# Patient Record
Sex: MALE | Race: WHITE | NOT HISPANIC OR LATINO | Employment: FULL TIME | URBAN - METROPOLITAN AREA
[De-identification: names, ages, dates, MRNs, and addresses within clinical notes are randomized per-mention and may not be internally consistent; named-entity substitution may affect disease eponyms.]

---

## 2024-03-11 ENCOUNTER — TELEPHONE (OUTPATIENT)
Dept: GASTROENTEROLOGY | Facility: CLINIC | Age: 64
End: 2024-03-11
Payer: COMMERCIAL

## 2024-03-11 NOTE — TELEPHONE ENCOUNTER
Patient called to schedule a colonoscopy with Dr Barrow.  Currently no order in Epic, and patient has not been seen by a PCP within [facility] in the last 18 months.     The patient will request an order from his [facility] PCP, endoscopy fax number provided.

## 2024-03-12 ENCOUNTER — MEDICAL CORRESPONDENCE (OUTPATIENT)
Dept: HEALTH INFORMATION MANAGEMENT | Facility: CLINIC | Age: 64
End: 2024-03-12
Payer: COMMERCIAL

## 2024-03-14 ENCOUNTER — TRANSCRIBE ORDERS (OUTPATIENT)
Dept: OTHER | Age: 64
End: 2024-03-14

## 2024-03-14 DIAGNOSIS — Z12.11 SCREEN FOR COLON CANCER: Primary | ICD-10-CM

## 2024-03-25 ENCOUNTER — TELEPHONE (OUTPATIENT)
Dept: GASTROENTEROLOGY | Facility: CLINIC | Age: 64
End: 2024-03-25
Payer: COMMERCIAL

## 2024-03-25 VITALS — BODY MASS INDEX: 41.52 KG/M2 | HEIGHT: 70 IN | WEIGHT: 290 LBS

## 2024-03-25 NOTE — TELEPHONE ENCOUNTER
"Endoscopy Scheduling Screen    Have you had a positive Covid test in the last 14 days?  No    What is your communication preference for Instructions and/or Bowel Prep?   MyChart    What insurance is in the chart?  Other:  medica    Ordering/Referring Provider:     TWILA CLARK      (If ordering provider performs procedure, schedule with ordering provider unless otherwise instructed. )    BMI: Estimated body mass index is 35.87 kg/m  as calculated from the following:    Height as of 5/29/15: 1.778 m (5' 10\").    Weight as of 5/29/15: 113.4 kg (250 lb).     Sedation Ordered  moderate sedation.   If patient BMI > 50 do not schedule in [facility].    If patient BMI > 45 do not schedule at [facility].    Are you taking methadone or Suboxone?  No    Have you had difficulties, pain, or discomfort during past endoscopy procedures?  No    Are you taking any prescription medications for pain 3 or more times per week?   NO, No RN review required.    Do you have a history of malignant hyperthermia?  No    (Females) Are you currently pregnant?   No     Have you been diagnosed or told you have pulmonary hypertension?   No    Do you have an LVAD?  No    Have you been told you have moderate to severe sleep apnea?  Yes (RN Review required for scheduling unless scheduling in Hospital.)  cpap  Have you been told you have COPD, asthma, or any other lung disease?  No    Do you have any heart conditions?  No     Have you ever had or are you waiting for an organ transplant?  No. Continue scheduling, no site restrictions.    Have you had a stroke or transient ischemic attack (TIA aka \"mini stroke\" in the last 6 months?   No    Have you been diagnosed with or been told you have cirrhosis of the liver?   No    Are you currently on dialysis?   No    Do you need assistance transferring?   No    BMI: Estimated body mass index is 35.87 kg/m  as calculated from the following:    Height as of 5/29/15: 1.778 m (5' 10\").    Weight as of 5/29/15: 113.4 kg (250 " lb).     Is patients BMI > 40 and scheduling location [facility]?  No    Do you take an injectable medication for weight loss or diabetes (excluding insulin)?  No    Do you take the medication Naltrexone?  No    Do you take blood thinners?  No       Prep   Are you currently on dialysis or do you have chronic kidney disease?  No    Do you have a diagnosis of diabetes?  No    Do you have a diagnosis of cystic fibrosis (CF)?  No    On a regular basis do you go 3 -5 days between bowel movements?  No    BMI > 40?  No    Preferred Pharmacy:    [facility] on [address] in [facility].       Final Scheduling Details     Procedure scheduled  Colonoscopy    Surgeon:  Danial     Date of procedure:  6/12     Pre-OP / PAC:   No - Not required for this site.    Location  [facility] - Patient preference.    Sedation   MAC/Deep Sedation  location      Patient Reminders:   You will receive a call from a Nurse to review instructions and health history.  This assessment must be completed prior to your procedure.  Failure to complete the Nurse assessment may result in the procedure being cancelled.      On the day of your procedure, please designate an adult(s) who can drive you home stay with you for the next 24 hours. The medicines used in the exam will make you sleepy. You will not be able to drive.      You cannot take public transportation, ride share services, or non-medical taxi service without a responsible caregiver.  Medical transport services are allowed with the requirement that a responsible caregiver will receive you at your destination.  We require that drivers and caregivers are confirmed prior to your procedure.

## 2024-03-25 NOTE — TELEPHONE ENCOUNTER
"Pre Assessment RN Review    Focused Assessments      CINTIA Hx  Estimated body mass index is 41.61 kg/m  as calculated from the following:    Height as of this encounter: 1.778 m (5' 10\").    Weight as of this encounter: 131.5 kg (290 lb).     Patient has reported / documented history CINTIA and reports he does use a device for sleep.     Device: CPAP    Severity Assessment    No sleep study available for review.    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     Scheduling Status & Recommendations    Delay scheduling, awaiting clinical input. Message sent to Narcisa Benedict, pre op surgery coordinator at [facility].   "

## 2024-03-26 ENCOUNTER — TELEPHONE (OUTPATIENT)
Dept: GASTROENTEROLOGY | Facility: CLINIC | Age: 64
End: 2024-03-26
Payer: COMMERCIAL

## 2024-03-26 NOTE — TELEPHONE ENCOUNTER
Per request (Jannie Richey RN  )    Information faxed:    Patient face sheet   11/15/23 Office Visit ([facility])   3/9/23 Colonoscopy Report  ([facility])   2/27/23 Office Visit ([facility])     Facility Information:   [facility]   Facility main number: [phone] (Narcisa Benedict's number: [phone])   ATTN: Narcisa Benedict (Pre-Op Surgery Coordinator)   Fax number: [phone]   E-Mail: [email or web address]    Haley Marks LPN

## 2024-03-28 NOTE — TELEPHONE ENCOUNTER
Approved to proceed as scheduled at [facility] per Narcisa Benedict, pre op surgery coordinator.     Jannie Richey RN  Endoscopy Procedure Pre-Assessment RN  [phone], option 4

## 2024-04-27 ENCOUNTER — HEALTH MAINTENANCE LETTER (OUTPATIENT)
Age: 64
End: 2024-04-27

## 2024-05-29 ENCOUNTER — TELEPHONE (OUTPATIENT)
Dept: GASTROENTEROLOGY | Facility: CLINIC | Age: 64
End: 2024-05-29
Payer: COMMERCIAL

## 2024-05-29 DIAGNOSIS — Z12.11 ENCOUNTER FOR SCREENING COLONOSCOPY: Primary | ICD-10-CM

## 2024-05-29 RX ORDER — BISACODYL 5 MG/1
TABLET, DELAYED RELEASE ORAL
Qty: 4 TABLET | Refills: 0 | Status: SHIPPED | OUTPATIENT
Start: 2024-05-29 | End: 2024-05-30

## 2024-05-29 NOTE — LETTER
May 30, 2024      Earle Blake  [address] [place] [address]    [facility] [address]              Dear Earle,      Colonoscopy      Procedure date: 6/12/24    Anticipated arrival time: 1100 AM   (Please note that arrival times may change)    Facility location: [facility]; [address], [place] [address]. Check in location: 2nd Floor       Important Procedure Reminders:     Prep Instructions:   Instructions on how to prepare for your upcoming procedure are found below. Please read instructions carefully. Deviation from instructions may result in less than desired outcomes and procedure may need to be rescheduled.   If you have additional questions regarding how to prepare for your upcoming procedure, contact our endoscopy pre assessment nurses at [phone] option 4 Monday through Friday 7:00am-5:00pm             Policy:   The medications used during the procedure will make you sleepy, so you won't be able to drive. On the day of your procedure, please have an adult ready to drive you home and stay with you for the next 24 hours.   You can't use public transportation, ride-share services, or non-medical taxi services without a responsible caregiver. Medical transport services are okay, but a caregiver must be there to receive you at your destination.   Make sure your  and caregiver are confirmed before your procedure.    Day of procedure:  Please keep in mind that arrival times may change. Let your  know there might be a one-hour window for changes.  We ask that you please check in at the  with your . Your  should remain on campus.  Expect to be at the procedure center for about 1.5-2.5 hours.    Please do not wear jewelry (i.e. earrings, rings, necklaces, watches, etc). Leave your purse, billfold, credit cards, and other valuables at home.   Bring insurance card and ID.     To cancel or reschedule your procedure:   Within 14 days  of your procedure if you develop any flu-like symptoms (such as fever, cough, shortness of breath), COVID-19 like symptoms or exposure to COVID-19, contact our endoscopy team at [phone] option 4 to determine if procedure can be completed or needs to be delayed.   If you need to cancel or reschedule, our endoscopy scheduling team can be reached at [phone], option 2. Monday through Friday, 7:00am-5:00pm.      Medication Reminders:    Please note the following medication holding recommendations:   N/A    ----------------------------------------------------------------------------------------------------------------------------------------------------------------------------------------------------------------------------------------------------------------------                                   Golytely Extended Colonoscopy Prep  Prep instructions for colonoscopy   Pre-Assessment Phone Number: [facility]; [phone] option 4      Please read these instructions carefully at least 7 days prior to your colonoscopy procedure. Be sure to follow all directions completely. The inside of your colon must be clean to allow for a complete examination for the presence of any growths, polyps, and/or abnormalities, as well as their biopsy or removal. A number of tips are included in order to make this part of the procedure as comfortable as possible.    Immediately:  A nurse will call you to go over instructions and your health history.  It's important to complete the nurse assessment before your procedure. If you don't, your procedure might have to be canceled.  You must arrange for an adult to drive you home after your exam. Your colonoscopy cannot be done unless you have a ride. If you need to use public transportation, someone must ride with you and stay with you for a minimum of 6-24 hours.  Check with your insurance company to be sure they will cover this exam.Arrange for a responsible adult to  drive you home on the day of the exam. This cannot be a taxi or a bus as you will need someone with you after the procedure.    7 days prior:  Talk to your prescribing provider: If you take blood thinners (such as Coumadin, Plavix, Xarelto, Eliquis, Lovenox, or others), these medications may need to be stopped temporarily before your procedure. Your prescribing provider will tell you what to do.   Talk to your prescribing provider: If you take prescription NSAIDS (such as Sulindac, Celebrex, Mobic, Relafen). Your prescribing provider will tell you what to do.   If you have diabetes, you should request an early morning appointment.  Stop taking fiber supplements and multivitamins containing iron, or any other medications containing iron.  Fill your prescription for 2 containers of Golytely and 4 Dulcolax tablets at the pharmacy.  It is very important that you stay well hydrated during the colonoscopy prep. The Golytely bowel prep is designed to clean out your colon, but it will not provide hydration. While you are taking the prescribed prep, you should also drink 64 oz. of Gatorade or similar sports drink product to drink for staying hydrated. (Avoid red and purple colors)  Stop eating corn, popcorn, nuts and foods with seeds.   Begin a restricted or low fiber diet (see list below).    2 days prior:  Drink fluids to be well hydrated, this is important. Drink at least 4 large glasses of water, Gatorade, or other similar sports drinks.  It is a good idea to use Vaseline on the skin around your anus after each bowel movement to prevent irritation. Wet wipes also help to reduce irritation.   You can have a light, low-fiber breakfast. You may also have a light, low-fiber lunch.  No solid foods after 1pm. Begin a clear liquid diet. (see list below).  At 4pm, take 2 Dulcolax (bisacodyl) tablets.  At 5pm, mix and drink half of a jug of Golytely bowel prep. Drink an 8 oz. Glass of Golytely every 10-15 minutes until half of  the jug is gone. Place the remainder of the Golytely in the refrigerator. Stay close to the bathroom.     1 day prior:  Continue clear liquid diet only (see examples below). Do not eat solid food this day.  Do not drink any red or purple colored drinks.  Stop taking NSAID pain relievers, such as Advil, Ibuprofen, Motrin, etc.  You may take Tylenol.  At 4 pm, take 2 Dulcolax (bisacodyl) tablets.  At 5 pm, drink the 2nd half of a jug of Golytely bowel prep. Drink an 8 oz. glass of Golytely every 10-15 minutes until the 1st jug of Golytely is gone.   The Golytely bowel prep will not keep you hydrated. You should drink 8-10 glasses of clear liquids throughout the day.  Before you go to bed, mix the 2nd container of Golytely and place in the refrigerator for the morning.      Procedure day:  6 hours before your check-in time, drink an 8 oz. Glass of Golytely every 10-15 minutes until half of the 2nd jug of Golytely is gone. You WILL NOT drink the entire 2nd jug of Golytely.   You may take your necessary morning medications with sips of water  Do not take diabetes medicine by mouth until after your exam.  You may drink clear liquids only up until 2 hours before your arrival time.  Do not smoke or swallow anything, including water or gum for at least 2 hours before your arrival time. This is a safety issue. Your procedure could be cancelled if you do not follow directions.  No chewing tobacco 6 hours prior to procedure arrival time.   Please do not wear jewelry (i.e. earrings, rings, necklaces, watches, etc) . Leave your purse, billfold, credit cards, and other valuables at home.   Please arrive with a responsible adult who can take you home after the test and stay with you for a minimum of 24 hours: The medicine used will make you sleepy and forgetful. If you do not have someone to take you home, we will cancel your procedure. If using public transportation you must have someone to ride with you.  Please perform your  nebulizer treatments and airway clearance therapy in the morning prior to the procedure (if applicable).  If you have asthma, bring your inhalers.                                          CLEAR LIQUID DIET   You may have:  Water, tea, coffee (no milk or cream)  Soda pop, Gatorade (not red or purple)  Jell-O, Popsicles (no milk or fruit pieces - not red or purple)  Fat-free soup broth or bouillon  Plain hard candy, such as clear life savers (not red or purple)  Clear juices and fruit-flavored drinks, such as apple juice, white grape juice, Hi-C, and Noe-Aid (not red or purple)   Do not have:  Milk or milk products such as ice cream, malts or shakes, or coffee creamer  Red or purple drinks of any kind such as cranberry juice or grape juice. Avoid red or purple Jell-O, Popsicles, Noe-Aid, sorbet, sherbet and candy  Juices with pulp such as orange, grapefruit, pineapple or tomato juice  Cream soups of any kind  Alcohol and beer  Protein drinks or protein powder     LOW FIBER DIET   You may have:    Starches: White bread, rolls, biscuits, croissants, [place] toast, white flour tortillas, waffles, pancakes, Ukrainian toast; white rice, noodles, pasta, macaroni; cooked and peeled potatoes; plain crackers, saltines; cooked farina or cream of rice; puffed rice, corn flakes, Rice Krispies, Special K   Vegetables: tender cooked and canned, vegetable broths  Fruits and fruit juices: Strained fruit juice, canned fruit without seeds or skin (not pineapple), applesauce, pear sauce, ripe bananas, melons (not watermelon)   Milk products: Milk (plain or flavored), cheese, cottage cheese, yogurt (no berries), custard, ice cream    Proteins: Tender, well-cooked ground beef, lamb, veal, ham, pork, chicken, turkey, fish or organ meats; eggs; creamy peanut butter   Fats and condiments:  Margarine, butter, oils, mayonnaise, sour cream, salad dressing, plain gravy; spices, cooked herbs; sugar, clear jelly, honey, syrup   Snacks, sweets and  drinks: Pretzels, hard candy; plain cakes and cookies (no nuts or seeds); gelatin, plain pudding, sherbet, Popsicles; coffee, tea, carbonated ( fizzy ) drinks Do not have:    Starches: Breads or rolls that contain nuts, seeds or fruit; whole wheat or whole grain breads that contain more than 1 gram of fiber per slice; cornbread; corn or whole wheat tortillas; potatoes with skin; brown rice, wild rice, kasha (buckwheat), and oatmeal   Vegetables: Any raw or steamed vegetables; vegetables with seeds; corn in any form   Fruits and fruit juices: Prunes, prune juice, raisins and other dried fruits, berries and other fruits with seeds, canned pineapple juices with pulp such as orange, grapefruit, pineapple or tomato juice  Milk products: Any yogurt with nuts, seeds or berries   Proteins: Tough, fibrous meats with gristle; cooked dried beans, peas or lentils; crunchy peanut butter  Fats and condiments: Pickles, olives, relish, horseradish; jam, marmalade, preserves   Snacks, sweets and drinks: Popcorn, nuts, seeds, granola, coconut, candies made with nuts or seeds; all desserts that contain nuts, seeds, raisins and other dried fruits, coconut, whole grains or bran.                  FAQ:    How do you know if your colon is cleaned out?   After completing the bowel prep, your bowel movements should be all liquid and yellow. Your bowel movements will look similar to urine in the toilet. If there are pieces of stool (poop) in the toilet, or if you can't see to the bottom of the toilet, please call our office for advice. Call [phone] and ask to speak with a nurse.   Why do you need a responsible  to take you home and stay with you?  We require a responsible adult to take you home for your safety. The sedation medicines used to relax you during the procedure can impair your judgement and reaction time, make you forgetful and possible a little unsteady. Do not drive, make any important decisions, or sign any legal  documents for 24 hours after your procedure.   It is normal to feel bloated and gassy after your procedure. Walking will help move the air through your colon. You can take non-aspirin pain relievers that contain acetaminophen (Tylenol).   When can you eat after your procedure?  You may resume your normal diet when you feel ready, unless advised otherwise by the doctor performing your procedure. Do not drink alcohol for 24 hours after your procedure.   You many resume normal activities (work, exercise, etc.) after 24 hours.   When will you get test results?  You should have your procedure results and any lab results (if applicable) by letter, Fl[employer] message, or phone call within 2 weeks. If you have any questions, please call the doctor that referred you for the procedure.       Thank you for choosing  [facility] for your procedure. If you are sent a survey regarding your care, please take the time to complete the questionnaire. We value your feedback!

## 2024-05-29 NOTE — TELEPHONE ENCOUNTER
Pre visit planning completed.      Procedure details:    Patient scheduled for Colonoscopy  on 6.12.2024.    Arrival time: 1100. Procedure time 1200    Facility location: [facility]; [address], [place] [address]. Check in location: 2nd Floor.    Sedation type: MAC    Pre op exam needed? N/A    Indication for procedure: screening colonoscopy      Chart review:     Electronic implanted devices? No    Recent diagnosis of diverticulitis within the last 6 weeks? No    Diabetic? No      Medication review:    Anticoagulants? No    NSAIDS? No NSAID medications per patient's medication list.  RN will verify with pre-assessment call.    Other medication HOLDING recommendations:  N/A      Prep for procedure:     Bowel prep recommendation: Extended Golytely. Bowel prep prescription sent to [facility] [id number] [place], [place] - [address]   Due to: poor prep/inadequate bowel prep in the past.     Prep instructions sent via angelica Bonilla RN  Endoscopy Procedure Pre Assessment RN  [phone] option 4

## 2024-05-30 RX ORDER — BISACODYL 5 MG/1
TABLET, DELAYED RELEASE ORAL
Qty: 4 TABLET | Refills: 0 | Status: SHIPPED | OUTPATIENT
Start: 2024-05-30

## 2024-05-30 NOTE — TELEPHONE ENCOUNTER
Pre assessment completed for upcoming procedure.   (Please see previous telephone encounter notes for complete details)    Patient  returned call.       Procedure details:    Arrival time and facility location reviewed.    Pre op exam needed? N/A    Designated  policy reviewed. Instructed to have someone stay 24  hours post procedure.       Medication review:    Medications reviewed. Please see supporting documentation below. Holding recommendations discussed (if applicable).       Prep for procedure:     Pt asked if prep could be resent to new pharmacy and to send a letter with instructions as getting into Mychart has been difficult, Rx was resent to       [id number] - [place], [place] - [address]       Patient declined to review procedure prep instructions on the phone. Instructed patient to review the procedure prep instructions at least 7 days prior to procedure due to necessary dietary modifications. NPO instructions reviewed.    Patient was instructed to call if any questions or concerns arise.        Any additional information needed:  N/A      Patient  verbalized understanding and had no questions or concerns at this time.      Khadra Lanza RN  Endoscopy Procedure Pre Assessment   [phone] option 4

## 2024-05-30 NOTE — TELEPHONE ENCOUNTER
Attempted to contact patient in order to complete pre assessment questions.     Patient's wife Kylee answered the phone.  There is no C2C on file for Kylee.  RN gave Kylee a message to have pt call pre-assessment back at [phone] Opt 4.     Callback communication sent via [employer].    Helen Bonilla RN

## 2024-06-12 ENCOUNTER — TRANSFERRED RECORDS (OUTPATIENT)
Dept: HEALTH INFORMATION MANAGEMENT | Facility: CLINIC | Age: 64
End: 2024-06-12
Payer: COMMERCIAL

## 2024-06-12 PROCEDURE — 88305 TISSUE EXAM BY PATHOLOGIST: CPT | Mod: 26 | Performed by: PATHOLOGY

## 2024-06-12 PROCEDURE — 88305 TISSUE EXAM BY PATHOLOGIST: CPT | Mod: TC,ORL | Performed by: COLON & RECTAL SURGERY

## 2024-06-13 ENCOUNTER — LAB REQUISITION (OUTPATIENT)
Dept: LAB | Facility: CLINIC | Age: 64
End: 2024-06-13
Payer: COMMERCIAL

## 2024-06-13 DIAGNOSIS — D12.2 BENIGN NEOPLASM OF ASCENDING COLON: ICD-10-CM

## 2024-06-13 DIAGNOSIS — K63.89 OTHER SPECIFIED DISEASES OF INTESTINE: ICD-10-CM

## 2024-06-13 DIAGNOSIS — K38.9 DISORDER OF APPENDIX: Primary | ICD-10-CM

## 2024-06-13 DIAGNOSIS — D12.0 BENIGN NEOPLASM OF CECUM: ICD-10-CM

## 2024-06-13 DIAGNOSIS — Z86.0100 PERSONAL HISTORY OF COLONIC POLYPS: ICD-10-CM

## 2024-06-13 DIAGNOSIS — D12.5 BENIGN NEOPLASM OF SIGMOID COLON: ICD-10-CM

## 2024-06-14 LAB
PATH REPORT.COMMENTS IMP SPEC: NORMAL
PATH REPORT.COMMENTS IMP SPEC: NORMAL
PATH REPORT.FINAL DX SPEC: NORMAL
PATH REPORT.GROSS SPEC: NORMAL
PATH REPORT.MICROSCOPIC SPEC OTHER STN: NORMAL
PATH REPORT.RELEVANT HX SPEC: NORMAL
PHOTO IMAGE: NORMAL

## 2024-06-17 ENCOUNTER — TELEPHONE (OUTPATIENT)
Dept: SURGERY | Facility: CLINIC | Age: 64
End: 2024-06-17
Payer: COMMERCIAL

## 2024-06-17 NOTE — TELEPHONE ENCOUNTER
Patient confirmed scheduled appointment:  Date: 6/28/24  Time: 3:20 pm  Visit type: CT Abdomen Pelvis  Provider: requested by Dr. Barrow  Location: [facility]  Testing/imaging: n/a  Additional notes: per message from Alanis PHILIPPE RN

## 2024-06-28 ENCOUNTER — ANCILLARY PROCEDURE (OUTPATIENT)
Dept: CT IMAGING | Facility: CLINIC | Age: 64
End: 2024-06-28
Attending: COLON & RECTAL SURGERY
Payer: COMMERCIAL

## 2024-06-28 DIAGNOSIS — K38.9 DISORDER OF APPENDIX: ICD-10-CM

## 2024-06-28 PROCEDURE — 74177 CT ABD & PELVIS W/CONTRAST: CPT | Mod: GC | Performed by: STUDENT IN AN ORGANIZED HEALTH CARE EDUCATION/TRAINING PROGRAM

## 2024-06-28 RX ORDER — IOPAMIDOL 755 MG/ML
122 INJECTION, SOLUTION INTRAVASCULAR ONCE
Status: COMPLETED | OUTPATIENT
Start: 2024-06-28 | End: 2024-06-28

## 2024-06-28 RX ADMIN — IOPAMIDOL 122 ML: 755 INJECTION, SOLUTION INTRAVASCULAR at 15:33

## 2024-06-28 NOTE — DISCHARGE INSTRUCTIONS

## 2024-09-22 NOTE — TELEPHONE ENCOUNTER
----- Message from Jannie Richey RN sent at 3/25/2024  1:36 PM CDT -----  Regarding: Fax Request - [facility] Anesthesia Review  Fax request for anesthesia review.     Facility name: [facility] .   Facility main number: [phone] (Narcisa Benedict's number: [phone])  ATTN: Narcisa Benedict (Pre-Op Surgery Coordinator)    Fax number: [phone]    Information needed:    Patient face sheet  11/15/23 Office Visit ([facility])  3/9/23 Colonoscopy Report  ([facility])  2/27/23 Office Visit ([facility])      Thank you,   Jannie Richey RN  Endoscopy Procedure Pre Assessment RN  [phone] option 4       R shoulder/complains of pain/discomfort

## 2025-01-11 ENCOUNTER — HEALTH MAINTENANCE LETTER (OUTPATIENT)
Age: 65
End: 2025-01-11